# Patient Record
Sex: MALE | Race: WHITE | NOT HISPANIC OR LATINO
[De-identification: names, ages, dates, MRNs, and addresses within clinical notes are randomized per-mention and may not be internally consistent; named-entity substitution may affect disease eponyms.]

---

## 2023-12-01 ENCOUNTER — NON-APPOINTMENT (OUTPATIENT)
Age: 48
End: 2023-12-01

## 2023-12-01 PROBLEM — Z00.00 ENCOUNTER FOR PREVENTIVE HEALTH EXAMINATION: Status: ACTIVE | Noted: 2023-12-01

## 2023-12-04 ENCOUNTER — NON-APPOINTMENT (OUTPATIENT)
Age: 48
End: 2023-12-04

## 2023-12-05 ENCOUNTER — APPOINTMENT (OUTPATIENT)
Dept: ELECTROPHYSIOLOGY | Facility: CLINIC | Age: 48
End: 2023-12-05
Payer: COMMERCIAL

## 2023-12-05 VITALS
OXYGEN SATURATION: 96 % | WEIGHT: 107 LBS | HEIGHT: 60 IN | SYSTOLIC BLOOD PRESSURE: 122 MMHG | HEART RATE: 65 BPM | BODY MASS INDEX: 21.01 KG/M2 | DIASTOLIC BLOOD PRESSURE: 79 MMHG

## 2023-12-05 DIAGNOSIS — E84.9 CYSTIC FIBROSIS, UNSPECIFIED: ICD-10-CM

## 2023-12-05 DIAGNOSIS — Z78.9 OTHER SPECIFIED HEALTH STATUS: ICD-10-CM

## 2023-12-05 DIAGNOSIS — F12.90 CANNABIS USE, UNSPECIFIED, UNCOMPLICATED: ICD-10-CM

## 2023-12-05 DIAGNOSIS — Z82.49 FAMILY HISTORY OF ISCHEMIC HEART DISEASE AND OTHER DISEASES OF THE CIRCULATORY SYSTEM: ICD-10-CM

## 2023-12-05 DIAGNOSIS — Z80.8 FAMILY HISTORY OF MALIGNANT NEOPLASM OF OTHER ORGANS OR SYSTEMS: ICD-10-CM

## 2023-12-05 DIAGNOSIS — Z80.9 FAMILY HISTORY OF MALIGNANT NEOPLASM, UNSPECIFIED: ICD-10-CM

## 2023-12-05 DIAGNOSIS — Q67.5 CONGENITAL DEFORMITY OF SPINE: ICD-10-CM

## 2023-12-05 DIAGNOSIS — Z87.738 PERSONAL HISTORY OF OTHER SPECIFIED (CORRECTED) CONGENITAL MALFORMATIONS OF DIGESTIVE SYSTEM: ICD-10-CM

## 2023-12-05 DIAGNOSIS — I42.8 OTHER CARDIOMYOPATHIES: ICD-10-CM

## 2023-12-05 DIAGNOSIS — M24.561 CONTRACTURE, RIGHT KNEE: ICD-10-CM

## 2023-12-05 DIAGNOSIS — Q66.30 UNSP FOOT, OTHER CONGEN VARUS DEFORMITIES OF FEET: ICD-10-CM

## 2023-12-05 PROCEDURE — 99205 OFFICE O/P NEW HI 60 MIN: CPT | Mod: 25

## 2023-12-05 PROCEDURE — 93000 ELECTROCARDIOGRAM COMPLETE: CPT

## 2023-12-05 RX ORDER — SPIRONOLACTONE 25 MG/1
25 TABLET ORAL DAILY
Refills: 0 | Status: ACTIVE | COMMUNITY

## 2023-12-05 RX ORDER — PANCRELIPASE 60000; 12000; 38000 [USP'U]/1; [USP'U]/1; [USP'U]/1
12000-38000 CAPSULE, DELAYED RELEASE PELLETS ORAL
Refills: 0 | Status: ACTIVE | COMMUNITY

## 2023-12-05 RX ORDER — DAPAGLIFLOZIN 10 MG/1
10 TABLET, FILM COATED ORAL DAILY
Refills: 0 | Status: ACTIVE | COMMUNITY

## 2023-12-05 RX ORDER — AZITHROMYCIN 250 MG/1
250 TABLET, FILM COATED ORAL
Refills: 0 | Status: ACTIVE | COMMUNITY

## 2023-12-05 RX ORDER — SACUBITRIL AND VALSARTAN 49; 51 MG/1; MG/1
49-51 TABLET, FILM COATED ORAL TWICE DAILY
Refills: 0 | Status: ACTIVE | COMMUNITY

## 2023-12-05 RX ORDER — METOPROLOL SUCCINATE 100 MG/1
100 TABLET, EXTENDED RELEASE ORAL DAILY
Refills: 0 | Status: ACTIVE | COMMUNITY

## 2023-12-05 RX ORDER — DORNASE ALFA 1 MG/ML
2.5 SOLUTION RESPIRATORY (INHALATION)
Refills: 0 | Status: ACTIVE | COMMUNITY

## 2023-12-06 ENCOUNTER — TRANSCRIPTION ENCOUNTER (OUTPATIENT)
Age: 48
End: 2023-12-06

## 2023-12-06 PROBLEM — M24.561 CONTRACTURE OF RIGHT KNEE: Status: ACTIVE | Noted: 2023-12-05

## 2023-12-06 PROBLEM — Q67.5 SCOLIOSIS, CONGENITAL: Status: ACTIVE | Noted: 2023-12-05

## 2023-12-06 PROBLEM — E84.9 CYSTIC FIBROSIS: Status: ACTIVE | Noted: 2023-12-05

## 2024-04-02 ENCOUNTER — APPOINTMENT (OUTPATIENT)
Dept: ELECTROPHYSIOLOGY | Facility: CLINIC | Age: 49
End: 2024-04-02
Payer: COMMERCIAL

## 2024-04-02 VITALS
HEART RATE: 70 BPM | DIASTOLIC BLOOD PRESSURE: 72 MMHG | WEIGHT: 110 LBS | BODY MASS INDEX: 25.57 KG/M2 | SYSTOLIC BLOOD PRESSURE: 126 MMHG | OXYGEN SATURATION: 95 %

## 2024-04-02 DIAGNOSIS — I50.20 UNSPECIFIED SYSTOLIC (CONGESTIVE) HEART FAILURE: ICD-10-CM

## 2024-04-02 PROCEDURE — 99215 OFFICE O/P EST HI 40 MIN: CPT | Mod: 25

## 2024-04-02 PROCEDURE — 93000 ELECTROCARDIOGRAM COMPLETE: CPT

## 2024-04-02 NOTE — DISCUSSION/SUMMARY
[EKG obtained to assist in diagnosis and management of assessed problem(s)] : EKG obtained to assist in diagnosis and management of assessed problem(s) [FreeTextEntry1] : Mr David Stoddard is a 48 year old man with a history of cystic fibrosis and congenital skeletal abnormalities and recently diagnosed non ischemic cardiomyopathy likely in the setting of myocarditis with an improving ejection fraction (15-20%, now 37%). Now that the patient has been on optimal guideline directed medical therapy, we recommend a repeat Echocardiogram to assess his LVEF. If his LVEF is >35%, then there is no indication for an ICD implant for primary prevention and the lack of inducible VT on EP testing is also reassuring. If his LVEF is 35% or less, we can consider an ICD implant for primary prevention. He will call us once his Echocardiogram is done so we can review and discuss results with him.

## 2024-04-02 NOTE — PHYSICAL EXAM
[Well Nourished] : well nourished [No Acute Distress] : no acute distress [Normal Conjunctiva] : normal conjunctiva [Normal Venous Pressure] : normal venous pressure [Normal S1, S2] : normal S1, S2 [No Murmur] : no murmur [No Rub] : no rub [No Gallop] : no gallop [Clear Lung Fields] : clear lung fields [Good Air Entry] : good air entry [No Respiratory Distress] : no respiratory distress  [Soft] : abdomen soft [Non Tender] : non-tender [Normal Bowel Sounds] : normal bowel sounds [Abnormal Gait] : abnormal gait [No Edema] : no edema [No Cyanosis] : no cyanosis [No Rash] : no rash [No Focal Deficits] : no focal deficits [Moves all extremities] : moves all extremities [Alert and Oriented] : alert and oriented [Normal Speech] : normal speech [Normal memory] : normal memory [de-identified] : Short stature

## 2024-04-02 NOTE — END OF VISIT
[Time Spent: ___ minutes] : I have spent [unfilled] minutes of time on the encounter. [FreeTextEntry3] : Seen and examined with ACP.  I agree with H and P, A and P.

## 2024-04-02 NOTE — CARDIOLOGY SUMMARY
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 A persistent L SVC was also noted

## 2024-04-02 NOTE — HISTORY OF PRESENT ILLNESS
[FreeTextEntry1] : Mr David Stoddard presents for follow up. Briefly, he is a 48 year old man with a history of cystic fibrosis and congenital skeletal abnormalities who was diagnosed last summer with NICM with EF at that time of 15-20%. 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 A persistent L SVC was also noted. The patient subsequently underwent an EP study which was negative for inducible VT (triple extra stimuli at two drive trains from the RV apex). Given these findings the patient was given a life vest and a primary prevention ICD was recommended for which the patient now seeks a second opinion.   At last visit, his lifevest was discontinued and he had his medications further up titrated. He had an Echocardiogram performed on December 6, 2024 which showed an EF 25-30%, however his medications were still being optimized. He reports feeling well and denies chest pain, shortness of breath, palpitations, near syncope or syncope. He denies any symptoms of heart failure. He states that he has no upcoming scheduled MRI or Echocardiogram.

## 2024-04-18 ENCOUNTER — NON-APPOINTMENT (OUTPATIENT)
Age: 49
End: 2024-04-18

## 2024-04-18 ENCOUNTER — APPOINTMENT (OUTPATIENT)
Dept: CARDIOLOGY | Facility: CLINIC | Age: 49
End: 2024-04-18
Payer: COMMERCIAL

## 2024-04-18 PROCEDURE — 93306 TTE W/DOPPLER COMPLETE: CPT
